# Patient Record
Sex: MALE | Race: OTHER | HISPANIC OR LATINO
[De-identification: names, ages, dates, MRNs, and addresses within clinical notes are randomized per-mention and may not be internally consistent; named-entity substitution may affect disease eponyms.]

---

## 2022-11-08 PROBLEM — Z00.00 ENCOUNTER FOR PREVENTIVE HEALTH EXAMINATION: Status: ACTIVE | Noted: 2022-11-08

## 2022-11-10 ENCOUNTER — APPOINTMENT (OUTPATIENT)
Dept: PEDIATRIC ORTHOPEDIC SURGERY | Facility: CLINIC | Age: 57
End: 2022-11-10

## 2022-11-10 VITALS — HEIGHT: 71 IN | TEMPERATURE: 96.7 F | BODY MASS INDEX: 28.7 KG/M2 | WEIGHT: 205 LBS

## 2022-11-10 PROCEDURE — 20605 DRAIN/INJ JOINT/BURSA W/O US: CPT

## 2022-11-10 PROCEDURE — 73080 X-RAY EXAM OF ELBOW: CPT

## 2022-11-10 PROCEDURE — 99203 OFFICE O/P NEW LOW 30 MIN: CPT | Mod: 25

## 2022-11-10 RX ORDER — DICLOFENAC SODIUM 50 MG/1
50 TABLET, DELAYED RELEASE ORAL TWICE DAILY
Qty: 30 | Refills: 2 | Status: ACTIVE | COMMUNITY
Start: 2022-11-10 | End: 1900-01-01

## 2022-11-10 NOTE — HISTORY OF PRESENT ILLNESS
[FreeTextEntry1] : This 57-year-old male is here for evaluation of a 1 year history of a swollen left olecranon bursa with fluid.  He does not recall an injury.

## 2022-11-10 NOTE — ASSESSMENT
[FreeTextEntry1] : Chronic left olecranon bursitis\par \par This patient was advised to return in 10 days.  If the fluid returns he will be we aspirated and injected with cortisone.  He has been made aware of the possibility of excision of the olecranon bursa.  The procedure was described as well as potential risks and complications.

## 2022-11-10 NOTE — DATA REVIEWED
[de-identified] : X-ray of the left elbow on 11/10/2022 (AP, lateral and oblique views) reveals degenerative changes within the elbow.

## 2022-11-10 NOTE — CONSULT LETTER
[Dear  ___] : Dear  [unfilled], [Consult Letter:] : I had the pleasure of evaluating your patient, [unfilled]. [Please see my note below.] : Please see my note below. [Consult Closing:] : Thank you very much for allowing me to participate in the care of this patient.  If you have any questions, please do not hesitate to contact me. [Sincerely,] : Sincerely, [FreeTextEntry3] : Dr Naidu\par

## 2022-11-10 NOTE — PHYSICAL EXAM
[FreeTextEntry1] : On physical examination patient has an obvious olecranon bursitis with fluid.  The olecranon is nontender.  He can achieve a full range of motion of the left elbow.

## 2022-11-22 ENCOUNTER — APPOINTMENT (OUTPATIENT)
Dept: PEDIATRIC ORTHOPEDIC SURGERY | Facility: CLINIC | Age: 57
End: 2022-11-22

## 2022-11-22 VITALS — WEIGHT: 205 LBS | TEMPERATURE: 97.3 F | HEIGHT: 71 IN | BODY MASS INDEX: 28.7 KG/M2

## 2022-11-22 PROCEDURE — 20605 DRAIN/INJ JOINT/BURSA W/O US: CPT

## 2022-11-22 PROCEDURE — 99213 OFFICE O/P EST LOW 20 MIN: CPT | Mod: 25

## 2022-11-22 NOTE — PROCEDURE
[de-identified] : The olecranon bursa was aspirated and 25 cc of clear straw-colored fluid was removed.\par 1 mL of 40 mg of Depo-Medrol and 2 mL of 1% plain lidocaine were instilled into the olecranon bursa

## 2022-11-22 NOTE — HISTORY OF PRESENT ILLNESS
[FreeTextEntry1] : This 57-year-old male returns for recurrence bursitis of the left elbow.  This is causing some discomfort for this patient.  The aspiration did not help.

## 2022-11-22 NOTE — PHYSICAL EXAM
[FreeTextEntry1] : On physical examination the patient has a significant olecranon bursitis with fluid.  He has a full range of motion of the elbow.  There is no evidence of cellulitis or infection

## 2022-12-08 ENCOUNTER — APPOINTMENT (OUTPATIENT)
Dept: PEDIATRIC ORTHOPEDIC SURGERY | Facility: CLINIC | Age: 57
End: 2022-12-08

## 2022-12-08 VITALS — BODY MASS INDEX: 28.7 KG/M2 | WEIGHT: 205 LBS | TEMPERATURE: 98 F | HEIGHT: 71 IN

## 2022-12-08 PROCEDURE — 20605 DRAIN/INJ JOINT/BURSA W/O US: CPT

## 2022-12-08 PROCEDURE — 99212 OFFICE O/P EST SF 10 MIN: CPT | Mod: 25

## 2022-12-08 NOTE — HISTORY OF PRESENT ILLNESS
[FreeTextEntry1] : This 57-year-old male returns for evaluation of a recurrence of fluid in the left olecranon bursa which is causing him some discomfort.

## 2022-12-08 NOTE — PHYSICAL EXAM
[FreeTextEntry1] : On physical examination the patient has a significant degree of fluid within the olecranon bursa.  He lacks full extension which causes pain.

## 2022-12-08 NOTE — PROCEDURE
[de-identified] : 40 cc of the clear straw-colored fluid has been aspirated from the left olecranon bursa.

## 2022-12-08 NOTE — ASSESSMENT
[FreeTextEntry1] : Recurrent left olecranon bursitis\par \par I advised the patient that he has to strongly consider excision of the olecranon bursa.  He has agreed to the surgery.  I have described to him the nature of the surgery as well as what should be expected postoperatively.  He has been made aware of potential complications including infection as well as failure of the surgery.  All questions have been answered.  He will be scheduled for excision of the olecranon bursa.

## 2023-01-23 ENCOUNTER — APPOINTMENT (OUTPATIENT)
Dept: PEDIATRIC ORTHOPEDIC SURGERY | Facility: HOSPITAL | Age: 58
End: 2023-01-23
Payer: COMMERCIAL

## 2023-01-23 ENCOUNTER — TRANSCRIPTION ENCOUNTER (OUTPATIENT)
Age: 58
End: 2023-01-23

## 2023-01-23 PROCEDURE — 24105 EXCISION OLECRANON BURSA: CPT | Mod: 74

## 2023-01-23 PROCEDURE — 99223 1ST HOSP IP/OBS HIGH 75: CPT | Mod: 25

## 2023-01-24 ENCOUNTER — TRANSCRIPTION ENCOUNTER (OUTPATIENT)
Age: 58
End: 2023-01-24

## 2023-02-01 ENCOUNTER — APPOINTMENT (OUTPATIENT)
Dept: PEDIATRIC ORTHOPEDIC SURGERY | Facility: CLINIC | Age: 58
End: 2023-02-01
Payer: COMMERCIAL

## 2023-02-01 VITALS
HEIGHT: 71 IN | BODY MASS INDEX: 28.7 KG/M2 | WEIGHT: 205 LBS | DIASTOLIC BLOOD PRESSURE: 89 MMHG | SYSTOLIC BLOOD PRESSURE: 140 MMHG

## 2023-02-01 PROCEDURE — 99213 OFFICE O/P EST LOW 20 MIN: CPT | Mod: 24

## 2023-02-01 NOTE — HISTORY OF PRESENT ILLNESS
[FreeTextEntry1] : This 57-year-old male returns for reevaluation status post attempted excision of olecranon bursa complicated by a significant allergic reaction to Ancef.  Patient is being worked up for anesthesia and antibiotic reaction.  He still wants the olecranon bursa removed but this will not be done for approximately 2 months.

## 2023-02-01 NOTE — REASON FOR VISIT
[Time Spent: ____ minutes] : Total time spent using  services: [unfilled] minutes. The patient's primary language is not English thus required  services. [Interpreters_IDNumber] : [id number] [Interpreters_FullName] : KARTHIK [TWNoteComboBox1] : [place]

## 2023-02-01 NOTE — ASSESSMENT
[FreeTextEntry1] : Left olecranon bursitis\par \par The patient will be rescheduled for surgical excision of the olecranon bursa.

## 2023-02-01 NOTE — PHYSICAL EXAM
[FreeTextEntry1] : On physical examination there is a full range of motion of the elbow with very minimal discomfort.  He has a large olecranon bursitis with significant amount of fluid within the bursa

## 2023-04-14 ENCOUNTER — APPOINTMENT (OUTPATIENT)
Dept: PEDIATRIC ORTHOPEDIC SURGERY | Facility: HOSPITAL | Age: 58
End: 2023-04-14

## 2023-05-11 ENCOUNTER — TRANSCRIPTION ENCOUNTER (OUTPATIENT)
Age: 58
End: 2023-05-11

## 2023-05-12 ENCOUNTER — APPOINTMENT (OUTPATIENT)
Dept: PEDIATRIC ORTHOPEDIC SURGERY | Facility: HOSPITAL | Age: 58
End: 2023-05-12
Payer: COMMERCIAL

## 2023-05-12 PROCEDURE — 24105 EXCISION OLECRANON BURSA: CPT

## 2023-05-12 PROCEDURE — 24120 EXC/CRTG B1 CST/B9 TUM RDS: CPT | Mod: 59

## 2023-05-13 ENCOUNTER — RESULT REVIEW (OUTPATIENT)
Age: 58
End: 2023-05-13

## 2023-05-22 ENCOUNTER — APPOINTMENT (OUTPATIENT)
Dept: PEDIATRIC ORTHOPEDIC SURGERY | Facility: CLINIC | Age: 58
End: 2023-05-22
Payer: COMMERCIAL

## 2023-05-22 VITALS — HEIGHT: 71 IN | TEMPERATURE: 97.6 F | BODY MASS INDEX: 28.7 KG/M2 | WEIGHT: 205 LBS

## 2023-05-22 PROCEDURE — 20605 DRAIN/INJ JOINT/BURSA W/O US: CPT | Mod: 58

## 2023-05-22 PROCEDURE — 73080 X-RAY EXAM OF ELBOW: CPT

## 2023-05-22 NOTE — DATA REVIEWED
[de-identified] : X-ray of the left elbow on 5/22/2023 (AP, lateral and oblique views) reveals no obvious abnormalities.

## 2023-05-22 NOTE — PROCEDURE
[de-identified] : From the10 cc of blood was aspirated the olecranon bursal area and a compression dressing was applied.

## 2023-05-22 NOTE — HISTORY OF PRESENT ILLNESS
[FreeTextEntry1] : This 57-year-old male is now 10 days status post excision of the olecranon bursa from the left elbow.  Patient has had some fluid buildup subcutaneously which is what.  He has had no drainage and the wound appears good.

## 2023-05-22 NOTE — ASSESSMENT
[FreeTextEntry1] : Status post excision of left olecranon bursa with excision of olecranon osteophyte\par \par Patient will return in 2 weeks for suture removal.

## 2023-05-22 NOTE — PHYSICAL EXAM
[FreeTextEntry1] : On physical examination the wound is well healed although the sutures are not ready to be removed.  The patient has a full range of motion of the left elbow without pain.

## 2023-06-06 ENCOUNTER — APPOINTMENT (OUTPATIENT)
Dept: PEDIATRIC ORTHOPEDIC SURGERY | Facility: CLINIC | Age: 58
End: 2023-06-06
Payer: COMMERCIAL

## 2023-06-06 VITALS — TEMPERATURE: 97 F | WEIGHT: 205 LBS | BODY MASS INDEX: 28.7 KG/M2 | HEIGHT: 71 IN

## 2023-06-06 DIAGNOSIS — M70.22 OLECRANON BURSITIS, LEFT ELBOW: ICD-10-CM

## 2023-06-06 PROCEDURE — 99024 POSTOP FOLLOW-UP VISIT: CPT

## 2023-06-06 NOTE — ASSESSMENT
[FreeTextEntry1] : Recurrent left olecranon bursitis\par \par Patient will return on a as needed basis.

## 2023-06-06 NOTE — HISTORY OF PRESENT ILLNESS
[FreeTextEntry1] : This 57-year-old male returns for reevaluation status post excision of left olecranon bursa.  The wound is well-healed and there has been no recurrence of fluid within the bursa.

## 2023-12-14 ENCOUNTER — APPOINTMENT (OUTPATIENT)
Dept: PEDIATRIC ORTHOPEDIC SURGERY | Facility: CLINIC | Age: 58
End: 2023-12-14
Payer: COMMERCIAL

## 2023-12-14 VITALS
HEIGHT: 71 IN | BODY MASS INDEX: 28.7 KG/M2 | SYSTOLIC BLOOD PRESSURE: 138 MMHG | WEIGHT: 205 LBS | TEMPERATURE: 97.1 F | DIASTOLIC BLOOD PRESSURE: 84 MMHG

## 2023-12-14 PROCEDURE — 99213 OFFICE O/P EST LOW 20 MIN: CPT | Mod: 25

## 2023-12-14 PROCEDURE — 20610 DRAIN/INJ JOINT/BURSA W/O US: CPT

## 2023-12-14 PROCEDURE — 73560 X-RAY EXAM OF KNEE 1 OR 2: CPT | Mod: 26

## 2023-12-14 NOTE — HISTORY OF PRESENT ILLNESS
[de-identified] : This 58-year-old is seen for evaluation of his right knee he has had a 2-week history of extreme onset of severe right knee pain associated with swelling stiffness and limp.  He did present to [facility] just recently because of severe pain and was sent home medications.  He had x-rays of the knee as well as an ultrasound.  He has not had any hiram locking buckling or sensation of instability.  Prior to this he had been doing well

## 2023-12-14 NOTE — PHYSICAL EXAM
[de-identified] : Exam today reveals an antalgic gait with limp on the right side he is using a soft knee brace.  He has good motion to the right hip the right knee has a moderate effusion with a 10 degree lack to full lection.  There is no instability on stress of the cruciate/collateral ligaments.  He does have obvious pain in the medial compartment and posterior medial joint line with a positive Steinmann/Mateo.  Popliteal fossa calf and neurovascular exam are negative.  Review of x-rays of the right knee from [facility] just recently taken 2 views revealed effusion only with mild arthritic changes.  No loose body/lesion.  A sonogram of the right lower extremity performed on that date was unremarkable as well

## 2023-12-14 NOTE — ASSESSMENT
[FreeTextEntry1] : Impression: Internal derangement right knee rule out torn medial meniscus.  The knee has been injected with methylprednisolone and lidocaine.  Will continue with nonsteroidals as recently prescribed by [facility].  Physical therapy has been ordered will return in 1 month if he is not improving MRI will become necessary

## 2024-01-11 ENCOUNTER — APPOINTMENT (OUTPATIENT)
Dept: PEDIATRIC ORTHOPEDIC SURGERY | Facility: CLINIC | Age: 59
End: 2024-01-11
Payer: COMMERCIAL

## 2024-01-11 PROCEDURE — 99212 OFFICE O/P EST SF 10 MIN: CPT

## 2024-01-11 NOTE — HISTORY OF PRESENT ILLNESS
[de-identified] : This 58-year-old returns for follow-up of his right knee he is improving with therapy and his medications following the steroid injection.  He informs me he does have to go to [place] to see his ailing mother.

## 2024-01-11 NOTE — PHYSICAL EXAM
[de-identified] : His exam today he is walking with no significant limp he has only a small effusion to the knee with good motion no instability minor discomfort in the medial compartment and joint line.

## 2024-01-11 NOTE — ASSESSMENT
[FreeTextEntry1] : Impression: Internal derangement right knee.  He will continue with a home exercise program while he is traveling he will return on a as needed basis on his return to the [facility].

## 2024-03-05 ENCOUNTER — APPOINTMENT (OUTPATIENT)
Dept: PEDIATRIC ORTHOPEDIC SURGERY | Facility: CLINIC | Age: 59
End: 2024-03-05
Payer: COMMERCIAL

## 2024-03-05 VITALS
SYSTOLIC BLOOD PRESSURE: 135 MMHG | TEMPERATURE: 96.8 F | HEIGHT: 71 IN | BODY MASS INDEX: 28 KG/M2 | DIASTOLIC BLOOD PRESSURE: 75 MMHG | WEIGHT: 200 LBS

## 2024-03-05 PROCEDURE — 99213 OFFICE O/P EST LOW 20 MIN: CPT

## 2024-03-05 RX ORDER — MELOXICAM 15 MG/1
15 TABLET ORAL
Qty: 30 | Refills: 1 | Status: ACTIVE | COMMUNITY
Start: 2024-03-05 | End: 1900-01-01

## 2024-03-05 NOTE — HISTORY OF PRESENT ILLNESS
[de-identified] : This patient returns she is continued to have significant pain swelling stiffness and difficulty bearing weight.  At times he feels like his knee will give out on him

## 2024-03-05 NOTE — ASSESSMENT
[FreeTextEntry1] : Impression: Internal derangement right knee rule out meniscal tear.  Because of his poor prognosis over time and continued symptomatology MRI has been ordered he is also placed on Mobic with GI precautions I will be in contact with him pending results of the study.  The potential for arthroscopic surgery has been discussed

## 2024-03-05 NOTE — PHYSICAL EXAM
[de-identified] : Exam today he has an antalgic gait with limp to the right knee moderate effusion pain in the medial compartment with a positive Steinmann/Mateo no instability on stress neurovascular status is intact

## 2024-03-08 ENCOUNTER — APPOINTMENT (OUTPATIENT)
Dept: PEDIATRIC ORTHOPEDIC SURGERY | Facility: CLINIC | Age: 59
End: 2024-03-08
Payer: COMMERCIAL

## 2024-03-08 VITALS — BODY MASS INDEX: 28 KG/M2 | HEIGHT: 71 IN | WEIGHT: 200 LBS | TEMPERATURE: 96.9 F

## 2024-03-08 DIAGNOSIS — M23.8X1 OTHER INTERNAL DERANGEMENTS OF RIGHT KNEE: ICD-10-CM

## 2024-03-08 PROCEDURE — 99213 OFFICE O/P EST LOW 20 MIN: CPT

## 2024-03-08 NOTE — PHYSICAL EXAM
[de-identified] : Examination today reveals an antalgic gait on the right side moderate effusion with painful motion to the knee slight restriction of full flexion no instability on stress obvious pain in the medial compartment posterior medial joint line positive Steinmann/Mateo.  Popliteal fossa calf neuro vas exam are unremarkable

## 2024-03-08 NOTE — ASSESSMENT
[FreeTextEntry1] : Impression: Complex tear medial meniscus right knee.  This patient has been made aware as to the above along with mild arthritic changes present.  He has been made aware he is a candidate for arthroscopic surgery if he feels he no longer is willing to tolerate his present level of pain/dysfunction.  He has had adequate conservative measures and time with no significant improvement noted.  I have discussed risk/complications of arthroscopic surgery to include reaction to anesthesia infection requiring antibiotics and possible debridement causalgia pain stiffness thromboembolic disease.  He has also been made aware he will be on crutches on the order of 1 week same-day surgery physical therapy to follow-up.  He is a  he will be out of work at a minimum of 6 weeks time.  He has been made aware because of the arthritic component to the knee present he may still continue to have ongoing symptomatology especially in view of the fact he does work as a  on uneven ground and using machinery.  He has been made aware the level of arthritis present at this time does not warrant consideration for knee replacement surgery.  I have answered his questions and he wishes to proceed as soon as possible.

## 2024-03-08 NOTE — HISTORY OF PRESENT ILLNESS
[de-identified] : This 58-year-old returns for reevaluation of his right knee as well as for further consultation following his MRI.  He has been made aware as to the findings complex tear of the medial meniscus along with degenerative changes no loose bodies.  He continues to be "miserable" with pain stiffness swelling and limp.  No hiram locking or buckling.

## 2024-03-29 ENCOUNTER — APPOINTMENT (OUTPATIENT)
Dept: PEDIATRIC ORTHOPEDIC SURGERY | Facility: HOSPITAL | Age: 59
End: 2024-03-29
Payer: COMMERCIAL

## 2024-03-29 ENCOUNTER — TRANSCRIPTION ENCOUNTER (OUTPATIENT)
Age: 59
End: 2024-03-29

## 2024-03-29 PROCEDURE — 29875 ARTHRS KNEE SURG SYNVCT LMTD: CPT | Mod: 59

## 2024-03-29 PROCEDURE — 29881 ARTHRS KNE SRG MNISECTMY M/L: CPT

## 2024-03-29 RX ORDER — KETOROLAC TROMETHAMINE 10 MG/1
10 TABLET, FILM COATED ORAL 3 TIMES DAILY
Qty: 12 | Refills: 0 | Status: ACTIVE | COMMUNITY
Start: 2024-03-29 | End: 1900-01-01

## 2024-03-29 RX ORDER — OXYCODONE AND ACETAMINOPHEN 5; 325 MG/1; MG/1
5-325 TABLET ORAL EVERY 8 HOURS
Qty: 15 | Refills: 0 | Status: ACTIVE | COMMUNITY
Start: 2024-03-29 | End: 1900-01-01

## 2024-04-01 RX ORDER — KETOROLAC TROMETHAMINE 10 MG/1
10 TABLET, FILM COATED ORAL 3 TIMES DAILY
Qty: 12 | Refills: 0 | Status: ACTIVE | COMMUNITY
Start: 2024-04-01 | End: 1900-01-01

## 2024-04-05 ENCOUNTER — APPOINTMENT (OUTPATIENT)
Dept: PEDIATRIC ORTHOPEDIC SURGERY | Facility: CLINIC | Age: 59
End: 2024-04-05
Payer: COMMERCIAL

## 2024-04-05 PROCEDURE — 73560 X-RAY EXAM OF KNEE 1 OR 2: CPT | Mod: RT

## 2024-04-05 PROCEDURE — 99024 POSTOP FOLLOW-UP VISIT: CPT

## 2024-04-05 NOTE — HISTORY OF PRESENT ILLNESS
[de-identified] : This patient is now 1 week status post arthroscopic partial medial meniscectomy right knee.  He is comfortable no fever or drainage

## 2024-04-05 NOTE — ASSESSMENT
[FreeTextEntry1] : Impression: Status post arthroscopic surgery right knee.  Will continue with restricted activities physical therapy has been ordered I will see him in 1 month

## 2024-04-05 NOTE — PHYSICAL EXAM
[de-identified] : Examination today reveals excellent motion minimal effusion no evidence of infection sutures have been removed.  X-rays ordered and taken today 2 views of the right knee reveal no metallic debris present

## 2024-05-03 ENCOUNTER — APPOINTMENT (OUTPATIENT)
Dept: PEDIATRIC ORTHOPEDIC SURGERY | Facility: CLINIC | Age: 59
End: 2024-05-03
Payer: COMMERCIAL

## 2024-05-03 DIAGNOSIS — S83.231A COMPLEX TEAR OF MEDIAL MENISCUS, CURRENT INJURY, RIGHT KNEE, INITIAL ENCOUNTER: ICD-10-CM

## 2024-05-03 PROCEDURE — 99024 POSTOP FOLLOW-UP VISIT: CPT

## 2024-05-03 RX ORDER — SULINDAC 200 MG/1
200 TABLET ORAL TWICE DAILY
Qty: 60 | Refills: 1 | Status: ACTIVE | COMMUNITY
Start: 2024-05-03 | End: 1900-01-01

## 2024-05-03 NOTE — HISTORY OF PRESENT ILLNESS
[de-identified] : This 58-year-old returns for follow-up of his right knee is doing significantly better

## 2024-05-03 NOTE — ASSESSMENT
[FreeTextEntry1] : Impression: Status post arthroscopic partial medial meniscectomy right knee.  Will continue with a home exercise program I have placed him on Clinoril with GI precautions return on a as needed basis

## 2024-06-20 NOTE — PHYSICAL EXAM
Check vitamin D level today  Will start the appropriate dose of vitamin D accordingly   [FreeTextEntry1] : On physical examination there is a full range of motion of the left elbow.  The wound is well-healed and the sutures have been removed.  Status post excision of

## 2024-12-16 ENCOUNTER — NON-APPOINTMENT (OUTPATIENT)
Age: 59
End: 2024-12-16

## 2024-12-16 ENCOUNTER — APPOINTMENT (OUTPATIENT)
Dept: PEDIATRIC ORTHOPEDIC SURGERY | Facility: CLINIC | Age: 59
End: 2024-12-16
Payer: COMMERCIAL

## 2024-12-16 VITALS
BODY MASS INDEX: 27.72 KG/M2 | SYSTOLIC BLOOD PRESSURE: 130 MMHG | HEIGHT: 71 IN | TEMPERATURE: 96.7 F | WEIGHT: 198 LBS | DIASTOLIC BLOOD PRESSURE: 75 MMHG

## 2024-12-16 DIAGNOSIS — M23.8X1 OTHER INTERNAL DERANGEMENTS OF RIGHT KNEE: ICD-10-CM

## 2024-12-16 DIAGNOSIS — S83.231A COMPLEX TEAR OF MEDIAL MENISCUS, CURRENT INJURY, RIGHT KNEE, INITIAL ENCOUNTER: ICD-10-CM

## 2024-12-16 PROCEDURE — 99212 OFFICE O/P EST SF 10 MIN: CPT

## 2024-12-16 RX ORDER — SULINDAC 200 MG/1
200 TABLET ORAL TWICE DAILY
Qty: 60 | Refills: 1 | Status: ACTIVE | COMMUNITY
Start: 2024-12-16 | End: 1900-01-01

## 2025-03-20 ENCOUNTER — NON-APPOINTMENT (OUTPATIENT)
Age: 60
End: 2025-03-20

## 2025-03-20 ENCOUNTER — APPOINTMENT (OUTPATIENT)
Dept: PEDIATRIC ORTHOPEDIC SURGERY | Facility: CLINIC | Age: 60
End: 2025-03-20
Payer: COMMERCIAL

## 2025-03-20 VITALS
HEART RATE: 67 BPM | DIASTOLIC BLOOD PRESSURE: 81 MMHG | BODY MASS INDEX: 27.72 KG/M2 | SYSTOLIC BLOOD PRESSURE: 123 MMHG | WEIGHT: 198 LBS | TEMPERATURE: 96.8 F | HEIGHT: 71 IN

## 2025-03-20 DIAGNOSIS — S83.231A COMPLEX TEAR OF MEDIAL MENISCUS, CURRENT INJURY, RIGHT KNEE, INITIAL ENCOUNTER: ICD-10-CM

## 2025-03-20 DIAGNOSIS — M17.10 UNILATERAL PRIMARY OSTEOARTHRITIS, UNSPECIFIED KNEE: ICD-10-CM

## 2025-03-20 DIAGNOSIS — M23.8X1 OTHER INTERNAL DERANGEMENTS OF RIGHT KNEE: ICD-10-CM

## 2025-03-20 PROCEDURE — 99213 OFFICE O/P EST LOW 20 MIN: CPT | Mod: 25

## 2025-03-20 PROCEDURE — 20610 DRAIN/INJ JOINT/BURSA W/O US: CPT | Mod: RT

## 2025-03-20 RX ORDER — SULINDAC 200 MG/1
200 TABLET ORAL TWICE DAILY
Qty: 60 | Refills: 1 | Status: ACTIVE | COMMUNITY
Start: 2025-03-20 | End: 1900-01-01

## 2025-03-24 PROBLEM — M17.10 PRIMARY LOCALIZED OSTEOARTHROSIS OF THE KNEE: Status: ACTIVE | Noted: 2025-03-24

## 2025-04-11 RX ORDER — HYLAN G-F 20 16MG/2ML
16 SYRINGE (ML) INTRAARTICULAR
Qty: 1 | Refills: 0 | Status: ACTIVE | COMMUNITY
Start: 2025-03-31

## 2025-05-06 ENCOUNTER — APPOINTMENT (OUTPATIENT)
Dept: PEDIATRIC ORTHOPEDIC SURGERY | Facility: CLINIC | Age: 60
End: 2025-05-06
Payer: COMMERCIAL

## 2025-05-06 DIAGNOSIS — M17.10 UNILATERAL PRIMARY OSTEOARTHRITIS, UNSPECIFIED KNEE: ICD-10-CM

## 2025-05-06 PROCEDURE — 20610 DRAIN/INJ JOINT/BURSA W/O US: CPT | Mod: RT

## 2025-05-13 ENCOUNTER — APPOINTMENT (OUTPATIENT)
Dept: PEDIATRIC ORTHOPEDIC SURGERY | Facility: CLINIC | Age: 60
End: 2025-05-13
Payer: COMMERCIAL

## 2025-05-13 DIAGNOSIS — M23.8X1 OTHER INTERNAL DERANGEMENTS OF RIGHT KNEE: ICD-10-CM

## 2025-05-13 PROCEDURE — 20610 DRAIN/INJ JOINT/BURSA W/O US: CPT | Mod: RT

## 2025-05-20 ENCOUNTER — APPOINTMENT (OUTPATIENT)
Dept: PEDIATRIC ORTHOPEDIC SURGERY | Facility: CLINIC | Age: 60
End: 2025-05-20
Payer: COMMERCIAL

## 2025-05-20 VITALS
SYSTOLIC BLOOD PRESSURE: 120 MMHG | WEIGHT: 198 LBS | TEMPERATURE: 96.5 F | HEIGHT: 71 IN | DIASTOLIC BLOOD PRESSURE: 80 MMHG | BODY MASS INDEX: 27.72 KG/M2

## 2025-05-20 DIAGNOSIS — M17.10 UNILATERAL PRIMARY OSTEOARTHRITIS, UNSPECIFIED KNEE: ICD-10-CM

## 2025-05-20 PROCEDURE — 20610 DRAIN/INJ JOINT/BURSA W/O US: CPT | Mod: RT
